# Patient Record
Sex: FEMALE | URBAN - METROPOLITAN AREA
[De-identification: names, ages, dates, MRNs, and addresses within clinical notes are randomized per-mention and may not be internally consistent; named-entity substitution may affect disease eponyms.]

---

## 2024-02-08 ENCOUNTER — APPOINTMENT (OUTPATIENT)
Dept: URBAN - METROPOLITAN AREA SURGERY 9 | Age: 69
Setting detail: DERMATOLOGY
End: 2024-02-08

## 2024-02-08 DIAGNOSIS — Z41.9 ENCOUNTER FOR PROCEDURE FOR PURPOSES OTHER THAN REMEDYING HEALTH STATE, UNSPECIFIED: ICD-10-CM

## 2024-02-08 PROCEDURE — OTHER OTHER: OTHER

## 2024-02-08 PROCEDURE — OTHER MIPS QUALITY: OTHER

## 2024-02-08 PROCEDURE — OTHER COSMETIC CONSULTATION: FILLERS: OTHER

## 2024-02-08 ASSESSMENT — LOCATION ZONE DERM: LOCATION ZONE: FACE

## 2024-02-08 ASSESSMENT — LOCATION SIMPLE DESCRIPTION DERM: LOCATION SIMPLE: LEFT CHEEK

## 2024-02-08 ASSESSMENT — LOCATION DETAILED DESCRIPTION DERM: LOCATION DETAILED: LEFT INFERIOR CENTRAL MALAR CHEEK

## 2024-02-08 NOTE — PROCEDURE: OTHER
Note Text (......Xxx Chief Complaint.): This diagnosis correlates with the
Detail Level: Zone
Render Risk Assessment In Note?: no
Other (Free Text): Pt has lots of skin laxity around mouth/jawline area. Also has loss of cheek volume. Informed pt that she would most likely need a face lift for the degree of laxity she has. Informed her we can help with volume in the cheeks and jawline with filler. Pt would need approx 5 syringes minimum (possibly even 6). 2 of Voluma into cheeks, and 2 of Volux in jaw line, and Refyne/Defyne in the marionettes/oral commissures. Recommended she do a face lift consultation.  Maintenance can always be achieved with fillers after surgical facelift.  Pt will consider all options.  If she decides to proceed with filler, cost written down and given.  She would need a 45min appt end of day. \\n\\nQuoted approx $5000:  $1000, $950 (Voluma/Voluma); $900, $900 (Volux/Volux); $600, $600 (Refyne/Defyne).  Planning to take $100 off each syringe after 2nd syringe.\\n\\nPt did [place] [employer] 2 [place]xs. Informed pt that helps with skin texture, not tightening.